# Patient Record
Sex: MALE | Race: WHITE | NOT HISPANIC OR LATINO | Employment: FULL TIME | URBAN - METROPOLITAN AREA
[De-identification: names, ages, dates, MRNs, and addresses within clinical notes are randomized per-mention and may not be internally consistent; named-entity substitution may affect disease eponyms.]

---

## 2021-09-22 ENCOUNTER — OFFICE VISIT (OUTPATIENT)
Dept: URGENT CARE | Facility: MEDICAL CENTER | Age: 51
End: 2021-09-22
Payer: COMMERCIAL

## 2021-09-22 VITALS
HEIGHT: 74 IN | DIASTOLIC BLOOD PRESSURE: 75 MMHG | SYSTOLIC BLOOD PRESSURE: 157 MMHG | TEMPERATURE: 97.8 F | HEART RATE: 76 BPM | RESPIRATION RATE: 18 BRPM

## 2021-09-22 DIAGNOSIS — M25.562 ACUTE PAIN OF LEFT KNEE: Primary | ICD-10-CM

## 2021-09-22 PROCEDURE — 99213 OFFICE O/P EST LOW 20 MIN: CPT | Performed by: PHYSICIAN ASSISTANT

## 2021-09-22 NOTE — PATIENT INSTRUCTIONS
Knee pain  Rest, ice, elevate  Naprosyn twice daily  Follow up with PCP in 3-5 days  Proceed to  ER if symptoms worsen

## 2021-09-22 NOTE — LETTER
September 22, 2021     Patient: Allison Mack   YOB: 1970   Date of Visit: 9/22/2021       To Whom it May Concern:    Allison Mack was seen in my clinic on 9/22/2021  He may return to work on 9/25/21  If you have any questions or concerns, please don't hesitate to call           Sincerely,          [id number]        CC: Allison Mack

## 2021-09-29 NOTE — TELEPHONE ENCOUNTER
NEW TO PRACTICE  RE: NPI     Patient was provided NPI # to [facility] in order to check insurance coverage for insurance not on our list    Patient is looking for an appt in [facility]